# Patient Record
Sex: FEMALE | Race: BLACK OR AFRICAN AMERICAN | URBAN - METROPOLITAN AREA
[De-identification: names, ages, dates, MRNs, and addresses within clinical notes are randomized per-mention and may not be internally consistent; named-entity substitution may affect disease eponyms.]

---

## 2021-03-26 ENCOUNTER — OFFICE VISIT (OUTPATIENT)
Dept: URBAN - METROPOLITAN AREA CLINIC 40 | Facility: CLINIC | Age: 36
End: 2021-03-26
Payer: COMMERCIAL

## 2021-03-26 ENCOUNTER — LAB OUTSIDE AN ENCOUNTER (OUTPATIENT)
Dept: URBAN - METROPOLITAN AREA CLINIC 40 | Facility: CLINIC | Age: 36
End: 2021-03-26

## 2021-03-26 ENCOUNTER — WEB ENCOUNTER (OUTPATIENT)
Dept: URBAN - METROPOLITAN AREA CLINIC 40 | Facility: CLINIC | Age: 36
End: 2021-03-26

## 2021-03-26 DIAGNOSIS — K62.5 RECTAL BLEEDING: ICD-10-CM

## 2021-03-26 DIAGNOSIS — K64.8 INTERNAL HEMORRHOID, BLEEDING: ICD-10-CM

## 2021-03-26 DIAGNOSIS — Z86.010 PERSONAL HISTORY OF COLONIC POLYPS: ICD-10-CM

## 2021-03-26 PROCEDURE — 99214 OFFICE O/P EST MOD 30 MIN: CPT | Performed by: INTERNAL MEDICINE

## 2021-03-26 RX ORDER — SODIUM SULFATE, MAGNESIUM SULFATE, AND POTASSIUM CHLORIDE 17.75; 2.7; 2.25 G/1; G/1; G/1
12 TABLETS TABLET ORAL
Qty: 24 TABLETS | Refills: 0 | OUTPATIENT
Start: 2021-03-26 | End: 2021-03-27

## 2021-03-26 NOTE — HPI-TODAY'S VISIT:
36 yo CF here for f/u for rectal bleeding. colonoscopy in 2018-mild internal hemorrhoids/hyperplastic polyps. she is having significant bleeding and is concerned. father had large precancerous polyps. has been having intermittent diarrhea too lately. she is a therapist

## 2021-04-05 PROBLEM — 75884004: Status: ACTIVE | Noted: 2021-03-26

## 2021-04-05 PROBLEM — 428283002: Status: ACTIVE | Noted: 2021-03-26

## 2021-04-09 ENCOUNTER — OFFICE VISIT (OUTPATIENT)
Dept: URBAN - METROPOLITAN AREA SURGERY CENTER 30 | Facility: SURGERY CENTER | Age: 36
End: 2021-04-09

## 2021-04-23 ENCOUNTER — OFFICE VISIT (OUTPATIENT)
Dept: URBAN - METROPOLITAN AREA CLINIC 40 | Facility: CLINIC | Age: 36
End: 2021-04-23

## 2024-03-04 ENCOUNTER — OV EP (OUTPATIENT)
Dept: URBAN - METROPOLITAN AREA CLINIC 52 | Facility: CLINIC | Age: 39
End: 2024-03-04

## 2024-03-04 ENCOUNTER — LAB (OUTPATIENT)
Dept: URBAN - METROPOLITAN AREA CLINIC 52 | Facility: CLINIC | Age: 39
End: 2024-03-04

## 2024-03-04 ENCOUNTER — OV EP (OUTPATIENT)
Dept: URBAN - METROPOLITAN AREA CLINIC 52 | Facility: CLINIC | Age: 39
End: 2024-03-04
Payer: COMMERCIAL

## 2024-03-04 VITALS
TEMPERATURE: 97.3 F | DIASTOLIC BLOOD PRESSURE: 71 MMHG | HEIGHT: 67 IN | HEART RATE: 75 BPM | BODY MASS INDEX: 37.67 KG/M2 | OXYGEN SATURATION: 98 % | SYSTOLIC BLOOD PRESSURE: 110 MMHG | WEIGHT: 240 LBS

## 2024-03-04 DIAGNOSIS — Z86.010 HISTORY OF COLON POLYPS: ICD-10-CM

## 2024-03-04 DIAGNOSIS — K64.8 INTERNAL HEMORRHOID, BLEEDING: ICD-10-CM

## 2024-03-04 PROBLEM — 428283002: Status: ACTIVE | Noted: 2024-03-04

## 2024-03-04 PROCEDURE — 99214 OFFICE O/P EST MOD 30 MIN: CPT | Performed by: INTERNAL MEDICINE

## 2024-03-04 PROCEDURE — 99204 OFFICE O/P NEW MOD 45 MIN: CPT | Performed by: INTERNAL MEDICINE

## 2024-03-04 RX ORDER — SOD SULF/POT CHLORIDE/MAG SULF 1.479 G
12 TABLETS THE FIRST DOSE THE EVENING BEFORE AND SECOND DOSE THE MORNING OF COLONOSCOPY TABLET ORAL TWICE A DAY
Qty: 24 | Refills: 0 | OUTPATIENT
Start: 2024-03-04 | End: 2024-03-05

## 2024-03-04 NOTE — PHYSICAL EXAM GASTROINTESTINAL
Abdomen , soft, nontender, nondistended , no guarding or rigidity , no masses palpable , normal bowel sounds , Liver and Spleen,  no hepatosplenomegaly , liver nontender  Rectal: External-NL Digital WNL.

## 2024-03-04 NOTE — HPI-TODAY'S VISIT:
36 yo CF here for f/u for rectal bleeding. colonoscopy in 2018-mild internal hemorrhoids/hyperplastic polyps. she is having significant bleeding and is concerned. father had large precancerous polyps. has been having intermittent diarrhea too lately. 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 2021-Gastric sleeve. More diarrhea since then. 2-3 BM/d.

## 2024-03-05 ENCOUNTER — OV EP (OUTPATIENT)
Dept: URBAN - METROPOLITAN AREA CLINIC 17 | Facility: CLINIC | Age: 39
End: 2024-03-05

## 2024-03-21 ENCOUNTER — OV EP (OUTPATIENT)
Dept: URBAN - METROPOLITAN AREA CLINIC 40 | Facility: CLINIC | Age: 39
End: 2024-03-21
Payer: COMMERCIAL

## 2024-03-21 ENCOUNTER — LAB (OUTPATIENT)
Dept: URBAN - METROPOLITAN AREA CLINIC 40 | Facility: CLINIC | Age: 39
End: 2024-03-21

## 2024-03-21 VITALS
WEIGHT: 241 LBS | BODY MASS INDEX: 37.83 KG/M2 | HEIGHT: 67 IN | HEART RATE: 70 BPM | SYSTOLIC BLOOD PRESSURE: 124 MMHG | DIASTOLIC BLOOD PRESSURE: 80 MMHG

## 2024-03-21 DIAGNOSIS — K64.8 INTERNAL HEMORRHOID, BLEEDING: ICD-10-CM

## 2024-03-21 DIAGNOSIS — R19.5 MUCUS IN STOOL: ICD-10-CM

## 2024-03-21 DIAGNOSIS — K92.1 RECTAL BLEEDING: ICD-10-CM

## 2024-03-21 DIAGNOSIS — Z90.3 S/P GASTRIC SLEEVE PROCEDURE: ICD-10-CM

## 2024-03-21 DIAGNOSIS — Z86.010 HISTORY OF COLON POLYPS: ICD-10-CM

## 2024-03-21 PROCEDURE — 99215 OFFICE O/P EST HI 40 MIN: CPT | Performed by: PHYSICIAN ASSISTANT

## 2024-03-21 RX ORDER — HYDROCORTISONE ACETATE 25 MG/1
1 SUPPOSITORY SUPPOSITORY RECTAL TWICE A DAY
Qty: 28 SUPPOSITORY | Refills: 0 | OUTPATIENT
Start: 2024-03-21 | End: 2024-04-04

## 2024-03-21 RX ORDER — LIDOCAINE AND MINERAL OIL AND PHENYLEPHRINE HCL AND WHITE PETROLATUM 50; 170; 2.5; 39 MG/G; MG/G; MG/G; MG/G
AS DIRECTED CREAM TOPICAL
Qty: 30 GRAM | Refills: 1 | Status: ON HOLD | COMMUNITY
Start: 2024-03-04 | End: 2024-04-03

## 2024-03-21 NOTE — PHYSICAL EXAM RECTAL:
(Chaperone, April, MA in room): normal tone, no external hemorrhoids, no melena, no masses palpable, no red blood, perianal skin tag+. Anoscopy deferred. Normal THEIRRY.

## 2024-03-21 NOTE — HPI-TODAY'S VISIT:
is a 38 year old Black female who presents to clinic for rectal bleeding. Seen 3/4/24 by Dr. Zhou with this complaint and given hydrocortisone cream and Recticare with little improvement. Has perianal pain with bleeding 2-3 x a week. Not seen in ED as advised when she contacted office complaining of large amounts of blood pr. A colonoscopy in 2018  by Dr. Falcon-mild internal hemorrhoids/hyperplastic polyps. she is having significant bleeding and is concerned. Her father had large precancerous polyps. She has been having intermittent diarrhea too lately. 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 More diarrhea since then. 2-3 BM/d. We asked she hold her adipex for time being. Patient maintains that the frequency of bleeding has not changed, not occurring daily with each bowel movement but she is concerned with the large amount.  Denies any significant rectal pain today and believes that Preparation H suppositories have helped somewhat.  No use of NSAIDs.  No significant heavy lifting but admits to recent travel.  No complaint of nausea, vomiting, fever or chills at this time.  She is tolerating normal diet without any significant abdominal pain.  She is concerned that with the rectal bleeding, she is having some mucus in the stool.  No melena.  Bowel habits are otherwise normal in regards to consistency which is chronically soft to loose for her since gastric sleeve surgery.  She has discontinued use of Adipex but states that she was only taking this 2-3 times in the last few months.  She maintains that she was told to continue to take this as needed since her gastric sleeve surgery.

## 2024-04-08 ENCOUNTER — COLON (OUTPATIENT)
Dept: URBAN - METROPOLITAN AREA SURGERY CENTER 30 | Facility: SURGERY CENTER | Age: 39
End: 2024-04-08

## 2024-04-08 ENCOUNTER — LAB (OUTPATIENT)
Dept: URBAN - METROPOLITAN AREA CLINIC 4 | Facility: CLINIC | Age: 39
End: 2024-04-08
Payer: COMMERCIAL

## 2024-04-08 DIAGNOSIS — D12.2 BENIGN NEOPLASM OF ASCENDING COLON: ICD-10-CM

## 2024-04-08 DIAGNOSIS — D12.3 BENIGN NEOPLASM OF TRANSVERSE COLON: ICD-10-CM

## 2024-04-08 DIAGNOSIS — Z86.010 ADENOMAS PERSONAL HISTORY OF COLONIC POLYPS: ICD-10-CM

## 2024-04-08 PROCEDURE — 88305 TISSUE EXAM BY PATHOLOGIST: CPT | Performed by: PATHOLOGY

## 2024-04-29 ENCOUNTER — HEM (OUTPATIENT)
Dept: URBAN - METROPOLITAN AREA CLINIC 40 | Facility: CLINIC | Age: 39
End: 2024-04-29

## 2024-05-22 ENCOUNTER — DASHBOARD ENCOUNTERS (OUTPATIENT)
Age: 39
End: 2024-05-22

## 2024-05-22 ENCOUNTER — OFFICE VISIT (OUTPATIENT)
Dept: URBAN - METROPOLITAN AREA CLINIC 40 | Facility: CLINIC | Age: 39
End: 2024-05-22
Payer: COMMERCIAL

## 2024-05-22 VITALS
WEIGHT: 246.8 LBS | TEMPERATURE: 97.9 F | DIASTOLIC BLOOD PRESSURE: 80 MMHG | HEART RATE: 72 BPM | SYSTOLIC BLOOD PRESSURE: 142 MMHG | BODY MASS INDEX: 38.74 KG/M2 | HEIGHT: 67 IN

## 2024-05-22 DIAGNOSIS — K64.1 GRADE II HEMORRHOIDS: ICD-10-CM

## 2024-05-22 PROCEDURE — 46221 LIGATION OF HEMORRHOID(S): CPT | Performed by: INTERNAL MEDICINE

## 2024-05-22 PROCEDURE — 99213 OFFICE O/P EST LOW 20 MIN: CPT | Performed by: INTERNAL MEDICINE

## 2024-06-11 ENCOUNTER — OFFICE VISIT (OUTPATIENT)
Dept: URBAN - METROPOLITAN AREA CLINIC 40 | Facility: CLINIC | Age: 39
End: 2024-06-11

## 2024-06-12 ENCOUNTER — TELEPHONE ENCOUNTER (OUTPATIENT)
Dept: URBAN - METROPOLITAN AREA CLINIC 40 | Facility: CLINIC | Age: 39
End: 2024-06-12